# Patient Record
Sex: FEMALE | Race: WHITE | NOT HISPANIC OR LATINO | Employment: PART TIME | URBAN - METROPOLITAN AREA
[De-identification: names, ages, dates, MRNs, and addresses within clinical notes are randomized per-mention and may not be internally consistent; named-entity substitution may affect disease eponyms.]

---

## 2017-12-11 ENCOUNTER — TRANSCRIBE ORDERS (OUTPATIENT)
Dept: ADMINISTRATIVE | Facility: HOSPITAL | Age: 42
End: 2017-12-11

## 2017-12-11 DIAGNOSIS — Z12.31 VISIT FOR SCREENING MAMMOGRAM: Primary | ICD-10-CM

## 2017-12-12 ENCOUNTER — GENERIC CONVERSION - ENCOUNTER (OUTPATIENT)
Dept: OBGYN CLINIC | Facility: MEDICAL CENTER | Age: 42
End: 2017-12-12

## 2017-12-12 ENCOUNTER — HOSPITAL ENCOUNTER (OUTPATIENT)
Dept: MAMMOGRAPHY | Facility: MEDICAL CENTER | Age: 42
Discharge: HOME | End: 2017-12-12
Payer: COMMERCIAL

## 2017-12-12 DIAGNOSIS — Z00.00 ENCOUNTER FOR GENERAL ADULT MEDICAL EXAMINATION WITHOUT ABNORMAL FINDINGS: ICD-10-CM

## 2017-12-12 DIAGNOSIS — Z12.31 VISIT FOR SCREENING MAMMOGRAM: ICD-10-CM

## 2017-12-12 PROCEDURE — G0202 SCR MAMMO BI INCL CAD: HCPCS

## 2018-01-10 NOTE — RESULT NOTES
Verified Results  (19[address] [facility]) Vaginitis/Vaginosis, DNA Probe [id number] 12:00AM Pilar Barrett Nose     Test Name Result Flag Reference   Candida species Positive A Negative   Gardnerella vaginalis Negative  Negative   Trichomonas vaginalis Negative  Negative

## 2018-01-12 NOTE — PROGRESS NOTES
Assessment    1  Encounter for routine gynecological examination (V72 31) (Z01 419)    Plan  Encounter for screening mammogram for malignant neoplasm of breast    · * MAMMO SCREENING BILATERAL W CAD; Status:Hold For - Scheduling; Requested  for:[id number];    Perform:[facility]; EAQ:[id number];ZFOUEVC;  Orvil Conine for screening mammogram for malignant neoplasm of breast; Ordered Courtney Sanchez;    Discussion/Summary  health maintenance visit Currently, she eats a healthy diet  the risks and benefits of cervical cancer screening were discussed cervical cancer screening is current next cervical cancer screening is due 2020 Breast cancer screening: monthly self breast exam was advised, mammogram is current, mammogram has been ordered and mammogram is needed every year  Advice and education were given regarding Kegel's exercises  Patient discussion: discussed with the patient  Chief Complaint  pt presents for a yearly exam, she is doing well  History of Present Illness  HPI: 37 yo patient here for annual exam,cycles regular   had brain surgery this past year and daughter was diagnosed with depression  had a stressful year  no GYN complaints   GYN HM, Adult Female [facility]: The patient is being seen for a gynecology evaluation  The last health maintenance visit was 1 year(s) ago  General Health: The patient's health since the last visit is described as good  Immunizations status: not up to date  Lifestyle:  She does not use tobacco  She denies alcohol use  She denies drug use  Reproductive health: the patient is premenopausal   she reports no menstrual problems  pregnancy history: G 5P 3, 2, 3  Screening: Cervical cancer screening includes a pap smear performed 2015 and human papilloma virus screening performed 2015  Breast cancer screening includes a mammogram performed 2015  She hasn't been previously screened for colorectal cancer        Review of Systems  no pelvic pain, no pelvic pressure, no vaginal pain, no vaginal discharge, no vaginal itching, no vaginal lump or mass, no vaginal odor, no vulvar pain, no vulvar itching, no vulvar lump or mass, no menorrhagia, no dysuria and no urinary loss of control  Constitutional: No fever, no chills, feels well, no tiredness, no recent weight gain or loss  ENT: no ear ache, no loss of hearing, no nosebleeds or nasal discharge, no sore throat or hoarseness  Cardiovascular: no complaints of slow or fast heart rate, no chest pain, no palpitations, no leg claudication or lower extremity edema  Respiratory: no complaints of shortness of breath, no wheezing, no dyspnea on exertion, no orthopnea or PND  Breasts: no complaints of breast pain, breast lump or nipple discharge  Gastrointestinal: no complaints of abdominal pain, no constipation, no nausea or diarrhea, no vomiting, no bloody stools  Genitourinary: no complaints of dysuria, no incontinence, no pelvic pain, no dysmenorrhea, no vaginal discharge or abnormal vaginal bleeding  Musculoskeletal: no complaints of arthralgia, no myalgia, no joint swelling or stiffness, no limb pain or swelling  Integumentary: no complaints of skin rash or lesion, no itching or dry skin, no skin wounds  Neurological: no complaints of headache, no confusion, no numbness or tingling, no dizziness or fainting  ROS reviewed  OB History  Pregnancy History (Brief):   Prior pregnancies: : 5  Para: 3 (full-term)       Active Problems    1  Encounter for routine gynecological examination ( 31) (Z01 419)   2  Encounter for screening mammogram for malignant neoplasm of breast (V76 12)   (Z12 31)   3  History of self breast exam    Past Medical History    · History of  5 (V22 2) (Z33 1)   · History of miscarriage (V13 29) (Z87 59)    The active problems and past medical history were reviewed and updated today        Surgical History    · History of  Section   · History of Cholecystectomy   · History of Dilation And Curettage   · History of Oral Surgery Tooth Extraction    The surgical history was reviewed and updated today  Family History  Mother    · No pertinent family history  Father    · Family history of hypercholesterolemia (V18 19) (Z83 49)   · Family history of hypertension (V17 49) (Z82 49)  Maternal Grandmother    · Family history of diabetes mellitus (V18 0) (Z83 3)  Paternal Uncle    · Family history of diabetes mellitus (V18 0) (Z83 3)    The family history was reviewed and updated today  Social History    · Denied: Alcohol Use (History)   · Always uses seat belt   · Caffeine use (V49 89) (F15 90)   · [employer]   · Denied: Drug Use (305 90)   ·    · Never A Smoker  The social history was reviewed and updated today  The social history was reviewed and is unchanged  Current Meds   1  Calcium 1200 CHEW; 1 Tab daily Recorded   2  [employer] Fish Oil CAPS; TAKE 1 CAPSULE Daily Recorded   3  Multivitamin TABS; Therapy: (Recorded:[id number]) to Recorded    Allergies    1  FLU (Nasal)   2  FLU (Whole)    Vitals   Recorded: [id number] 00:29ML   Systolic 316   Diastolic 62   LMP [id number]   Height 5 ft 4 5 in   Weight 149 lb 2 08 oz   BMI Calculated 25 2   BSA Calculated 1 74     Physical Exam    Constitutional   General appearance: No acute distress, well appearing and well nourished  Neck   Neck: Normal, supple, trachea midline, no masses  Thyroid: Normal, no thyromegaly  Pulmonary   Respiratory effort: No increased work of breathing or signs of respiratory distress  Auscultation of lungs: Clear to auscultation  Cardiovascular   Auscultation of heart: Normal rate and rhythm, normal S1 and S2, no murmurs  Peripheral vascular exam: Normal pulses Throughout  Genitourinary   External genitalia: Normal and no lesions appreciated  Vagina: Normal, no lesions or dryness appreciated      Urethra: Normal     Urethral meatus: Normal     Bladder: Normal, soft, non-tender and no prolapse or masses appreciated  Cervix: Normal, no palpable masses  Uterus: Normal, non-tender, not enlarged, and no palpable masses  Adnexa/parametria: Normal, non-tender and no fullness or masses appreciated  Chest   Breasts: Normal and no dimpling or skin changes noted  Abdomen   Abdomen: Normal, non-tender, and no organomegaly noted  Lymphatic   Palpation of lymph nodes in neck, axillae, groin and/or other locations: No lymphadenopathy or masses noted  Skin   Skin and subcutaneous tissue: Normal skin turgor and no rashes  Palpation of skin and subcutaneous tissue: Normal     Psychiatric   Orientation to person, place, and time: Normal     Mood and affect: Normal        Results/Data  DIGTL [facility] W/CAD [id number] 10:57AM Fernando Sharma     Test Name Result Flag Reference   DIGTL SCREEN MAMMO W/CAD (Report)     No Address;   11/30/2015 1100   11/30/2015 1115   4 18 X 24 MAMMO     Patient History-   Patient has never smoked  Patient's BMI is 25 2  Reason for exam- screening (asymptomatic)  Digital Screening Mammogram   Bilateral CC and MLO view(s) were taken  Technologist- ADE De La Rosa (R)(M)   Prior study comparison- November 25, 2014, bilateral digital    screening mammogram performed at [facility]  There are scattered fibroglandular densities  No dominant soft tissue mass, architectural distortion or    suspicious calcifications are noted  The skin and nipple    contours are within normal limits  IMPRESSION-  No evidence of malignancy  No significant changes   when compared with prior studies  ASSESSMENT- BiRad-1 - Negative     Recommendation-   Routine screening mammogram of both breasts in 1 year  A    reminder letter will be scheduled  Analyzed by CAD     8-10% of cancers will be missed on mammography   Management of a    palpable abnormality must be based on clinical grounds  Patients   will be notified of their results via letter from our facility  Accredited by [employer] of Radiology and FDA  Transcription Location- [address] [place] [address] Value(s)-   Tyrer-Cuzick 10 Year- 1 470%, Tyrer-Cuzick Lifetime- 10 845%,    Myriad Table- 1 5%, CRISTO 5 Year- 0 6%, NCI Lifetime- 9 8%     - KARMEN LENZ MD   Reading Radiologist- KARMEN Seth MD   Electronically Signed- KARMEN LENZ MD   Released Date Time- 11/30/15 1124   ------------------------------------------------------------------------------   [id number]^CARLOS NEELY   [id number]^CARLOS NEELY     () Pap IG, HPV-hr [id number] 12:00AM Gely Barrett     Test Name Result Flag Reference   DIAGNOSIS: Comment     NEGATIVE FOR INTRAEPITHELIAL LESION AND MALIGNANCY  THIS SPECIMEN WAS RESCREENED AS PART OF OUR  PROGRAM    Specimen adequacy: Comment     Satisfactory for evaluation  Endocervical and/or squamous metaplastic  cells (endocervical component) are present  Clinician provided ICD9: Comment     V72 31 ; Routine gynecological examination   Performed by: Randee Salguero, Cytotechnologist (ASCP)   QC reviewed by: Aaron Gaxiola Cytotechnologist (ASCP)     Karrie Nissen Note: Comment     The Pap smear is a screening test designed to aid in the detection of  premalignant and malignant conditions of the uterine cervix  It is not a  diagnostic procedure and should not be used as the sole means of detecting  cervical cancer  Both false-positive and false-negative reports do occur  HPV, high-risk Negative  Negative   This high-risk HPV test detects thirteen high-risk types  (16/18/31/33/35/39/45/51/52/56/58/59/68) without differentiation       Signatures   Electronically signed by : BOLIVAR Mathias ; Jul 27 2016 11:59AM EST                       (Author)

## 2018-12-07 ENCOUNTER — TRANSCRIBE ORDERS (OUTPATIENT)
Dept: ADMINISTRATIVE | Facility: HOSPITAL | Age: 43
End: 2018-12-07

## 2018-12-07 DIAGNOSIS — Z12.39 SCREENING BREAST EXAMINATION: Primary | ICD-10-CM

## 2019-01-22 ENCOUNTER — HOSPITAL ENCOUNTER (OUTPATIENT)
Dept: MAMMOGRAPHY | Facility: CLINIC | Age: 44
Discharge: HOME | End: 2019-01-22
Payer: COMMERCIAL

## 2019-01-22 VITALS — WEIGHT: 149 LBS | BODY MASS INDEX: 24.83 KG/M2 | HEIGHT: 65 IN

## 2019-01-22 DIAGNOSIS — Z12.39 SCREENING BREAST EXAMINATION: ICD-10-CM

## 2019-01-22 PROCEDURE — 77067 SCR MAMMO BI INCL CAD: CPT

## 2023-05-12 ENCOUNTER — OFFICE VISIT (OUTPATIENT)
Dept: URGENT CARE | Facility: CLINIC | Age: 48
End: 2023-05-12

## 2023-05-12 VITALS
RESPIRATION RATE: 16 BRPM | TEMPERATURE: 98.2 F | OXYGEN SATURATION: 99 % | BODY MASS INDEX: 27.31 KG/M2 | WEIGHT: 160 LBS | DIASTOLIC BLOOD PRESSURE: 60 MMHG | HEIGHT: 64 IN | HEART RATE: 84 BPM | SYSTOLIC BLOOD PRESSURE: 106 MMHG

## 2023-05-12 DIAGNOSIS — J30.2 SEASONAL ALLERGIES: Primary | ICD-10-CM

## 2023-05-12 RX ORDER — CHLORAL HYDRATE 500 MG
1600 CAPSULE ORAL DAILY
COMMUNITY

## 2023-05-12 RX ORDER — ACETAMINOPHEN 500 MG
1 TABLET ORAL DAILY
COMMUNITY

## 2023-05-12 NOTE — PROGRESS NOTES
"  [facility]        NAME: Leah Somers is a 49 y o  female  : 1975    MRN: [id number]  DATE: May 12, 2023  TIME: 11:30 AM    Assessment and Plan   Seasonal allergies [J30 2]  1  Seasonal allergies              Patient Instructions   - Take Tylenol or Motrin as needed  - Recommend decongestant  - Recommend Allergy medication  - Recommend Flonase as needed    Follow up with PCP in 3-5 days  Proceed to  ER if symptoms worsen  Chief Complaint     Chief Complaint   Patient presents with   • Earache     PT presents with bilateral earache x 1 day, post nasal drip, nasal congestion, and mild cough x 5 days  History of Present Illness       48 y/o M presents for B/L earache x 1 day  Pt admits ear was \"clogged,\" but no longer is  Admits to PND, nasal congestion, and cough x 5 days  Son had similar symptoms for a day which resolved  Using all natural herbal products with relief  Review of Systems   Review of Systems   Constitutional: Negative for chills and fever  HENT: Positive for congestion, ear pain, postnasal drip, rhinorrhea and sneezing  Negative for sore throat  Respiratory: Positive for cough            Current Medications       Current Outpatient Medications:   •  ascorbic acid (VITAMIN C) 1000 MG tablet, Take 1,000 mg by mouth daily, Disp: , Rfl:   •  Calcium Carbonate-Vit D-Min (Calcium 1200) 8397-5470 MG-UNIT CHEW, Chew 1 tablet daily, Disp: , Rfl:   •  Cholecalciferol 125 MCG (5000 UT) TABS, Take 5,000 Units by mouth daily, Disp: , Rfl:   •  Elderberry 500 MG CAPS, , Disp: , Rfl:   •  Omega-3 Fatty Acids (fish oil) 1,000 mg, Take 1,600 mg by mouth daily, Disp: , Rfl:   •  Probiotic Product (PROBIOTIC-10 PO), , Disp: , Rfl:     Current Allergies     Allergies as of 2023 - Reviewed 2023   Allergen Reaction Noted   • Other Cough 07/15/2015   • Hemophilus b polysaccharide vaccine Rash 2014   • Influenza virus vaccine Rash 2014            The " "following portions of the patient's history were reviewed and updated as appropriate: allergies, current medications, past family history, past medical history, past social history, past surgical history and problem list      History reviewed  No pertinent past medical history  Past Surgical History:   Procedure Laterality Date   •  SECTION     • CHOLECYSTECTOMY     • WISDOM TOOTH EXTRACTION         No family history on file  Medications have been verified  Objective   /60   Pulse 84   Temp 98 2 °F (36 8 °C)   Resp 16   Ht 5' 4\" (1 626 m)   Wt 72 6 kg (160 lb)   SpO2 99%   BMI 27 46 kg/m²   No LMP recorded  Physical Exam     Physical Exam  Vitals and nursing note reviewed  Constitutional:       General: She is not in acute distress  Appearance: She is not toxic-appearing  HENT:      Head: Normocephalic and atraumatic  Right Ear: Tympanic membrane, ear canal and external ear normal       Left Ear: Tympanic membrane, ear canal and external ear normal       Nose: Rhinorrhea present  Mouth/Throat:      Mouth: Mucous membranes are moist       Pharynx: No oropharyngeal exudate or posterior oropharyngeal erythema  Eyes:      General:         Right eye: No discharge  Left eye: No discharge  Conjunctiva/sclera: Conjunctivae normal    Pulmonary:      Effort: Pulmonary effort is normal  No respiratory distress  Breath sounds: Normal breath sounds  No wheezing or rales  Musculoskeletal:      Cervical back: No tenderness  Lymphadenopathy:      Cervical: No cervical adenopathy  Neurological:      Mental Status: She is alert     Psychiatric:         Mood and Affect: Mood normal          Behavior: Behavior normal                    "

## 2023-05-12 NOTE — PATIENT INSTRUCTIONS
- Take Tylenol or Motrin as needed  - Recommend decongestant  - Recommend Allergy medication  - Recommend Flonase as needed

## 2023-10-17 ENCOUNTER — HOSPITAL ENCOUNTER (OUTPATIENT)
Dept: MAMMOGRAPHY | Facility: MEDICAL CENTER | Age: 48
Discharge: HOME | End: 2023-10-17
Payer: COMMERCIAL

## 2023-10-17 VITALS — BODY MASS INDEX: 27.31 KG/M2 | HEIGHT: 64 IN | WEIGHT: 160 LBS

## 2023-10-17 DIAGNOSIS — Z12.31 ENCOUNTER FOR SCREENING MAMMOGRAM FOR MALIGNANT NEOPLASM OF BREAST: ICD-10-CM

## 2023-10-17 PROCEDURE — 77063 BREAST TOMOSYNTHESIS BI: CPT

## 2023-10-17 PROCEDURE — 77067 SCR MAMMO BI INCL CAD: CPT

## 2024-04-23 ENCOUNTER — ANNUAL EXAM (OUTPATIENT)
Dept: OBGYN CLINIC | Facility: MEDICAL CENTER | Age: 49
End: 2024-04-23
Payer: COMMERCIAL

## 2024-04-23 VITALS
SYSTOLIC BLOOD PRESSURE: 100 MMHG | HEIGHT: 64 IN | WEIGHT: 168.5 LBS | BODY MASS INDEX: 28.77 KG/M2 | DIASTOLIC BLOOD PRESSURE: 60 MMHG

## 2024-04-23 DIAGNOSIS — Z01.419 ENCOUNTER FOR WELL WOMAN EXAM WITH ROUTINE GYNECOLOGICAL EXAM: Primary | ICD-10-CM

## 2024-04-23 DIAGNOSIS — Z12.31 ENCOUNTER FOR SCREENING MAMMOGRAM FOR MALIGNANT NEOPLASM OF BREAST: ICD-10-CM

## 2024-04-23 PROCEDURE — S0612 ANNUAL GYNECOLOGICAL EXAMINA: HCPCS | Performed by: OBSTETRICS & GYNECOLOGY

## 2024-04-23 RX ORDER — MULTIVITAMIN
1 CAPSULE ORAL DAILY
COMMUNITY

## 2024-04-23 NOTE — PROGRESS NOTES
"[facility] [address], [place], [place]    ASSESSMENT/PLAN: Mellisa Reza is a 48 y.o.  who presents for annual gynecologic exam.    Encounter for routine gynecologic examination  - Routine well woman exam completed today.  - Cervical Cancer Screening: Current ASCCP Guidelines reviewed. Last Pap: 2023. Next Pap Due:   - Contraceptive counseling discussed.  Current contraception: Vasectomy   - Breast Cancer Screening: Last Mammogram 10/17/2023, mammo ordered    Additional problems addressed at this visit:  1. Encounter for screening mammogram for malignant neoplasm of breast  -     Mammo screening bilateral w 3d & cad; Future; Expected date: 10/18/2024          CC:  Annual Gynecologic Examination    HPI: Mellisa Reza is a 48 y.o.  who presents for annual gynecologic examination.  Gynecologic Exam      She reports no new changes to her health.  She reports no breast concerns. She gets regular periods. She has no vaginal discharge, vulvar or vaginal lesions, pelvic pain, or abnormal bleeding.  She has no sexual health concerns and is currently sexually active with one male partner.  She contracepts with vasectomy.     The following portions of the patient's history were reviewed and updated as appropriate: allergies, current medications, past family history, past medical history, obstetric history, gynecologic history, past social history, past surgical history and problem list.    Review of Systems   Constitutional: Negative.    HENT: Negative.     Eyes: Negative.    Respiratory: Negative.     Cardiovascular: Negative.    Gastrointestinal: Negative.    Genitourinary: Negative.    Musculoskeletal: Negative.    All other systems reviewed and are negative.        Objective:  /60   Ht 5' 4\" (1.626 m)   Wt 76.4 kg (168 lb 8 oz)   LMP 2024 (Exact Date)   BMI 28.92 kg/m²    Physical Exam  Vitals reviewed.   Constitutional:       General: She is " not in acute distress.     Appearance: She is well-developed.   HENT:      Head: Normocephalic and atraumatic.      Nose: Nose normal.   Cardiovascular:      Rate and Rhythm: Normal rate.   Pulmonary:      Effort: Pulmonary effort is normal. No respiratory distress.   Chest:   Breasts:     Breasts are symmetrical.      Right: Normal. No mass, nipple discharge, skin change or tenderness.      Left: Normal. No mass, nipple discharge, skin change or tenderness.   Abdominal:      General: There is no distension.      Palpations: Abdomen is soft. There is no mass.      Tenderness: There is no abdominal tenderness. There is no guarding or rebound.   Genitourinary:     General: Normal vulva.      Exam position: Lithotomy position.      Labia:         Right: No lesion.         Left: No lesion.       Urethra: No prolapse (urethral meatus normal).      Vagina: Normal. No vaginal discharge, erythema or bleeding.      Cervix: Normal.      Uterus: Normal.       Adnexa: Right adnexa normal and left adnexa normal.   Musculoskeletal:         General: Normal range of motion.      Cervical back: Normal range of motion.   Lymphadenopathy:      Upper Body:      Right upper body: No supraclavicular, axillary or pectoral adenopathy.      Left upper body: No supraclavicular, axillary or pectoral adenopathy.      Lower Body: No right inguinal adenopathy. No left inguinal adenopathy.   Skin:     General: Skin is warm and dry.   Neurological:      Mental Status: She is alert and oriented to person, place, and time.   Psychiatric:         Behavior: Behavior normal.         Thought Content: Thought content normal.         Judgment: Judgment normal.             Gely Barrett MD  [facility]  04/23/24 2:26 PM